# Patient Record
Sex: FEMALE | Race: WHITE | URBAN - NONMETROPOLITAN AREA
[De-identification: names, ages, dates, MRNs, and addresses within clinical notes are randomized per-mention and may not be internally consistent; named-entity substitution may affect disease eponyms.]

---
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
## 2023-01-13 NOTE — PROCEDURE: ADDITIONAL NOTES
Per MAMI Ablert:     \"Please see string of encounters. Pt updated on recommendations. Plavix is ordered under PCP, Neuros recommendation was to stop the Plavix after the injection. Did you want to discontinue it?\"  
Additional Notes: Patient recently had this piercing performed - she declines treatment today and says she is already taking care of it at home.
Render Risk Assessment In Note?: no
Detail Level: Zone